# Patient Record
(demographics unavailable — no encounter records)

---

## 2024-10-10 NOTE — CONSULT LETTER
[Dear  ___] : Dear  [unfilled], [Courtesy Letter:] : I had the pleasure of seeing your patient, [unfilled], in my office today. [Please see my note below.] : Please see my note below. [Consult Closing:] : Thank you very much for allowing me to participate in the care of this patient.  If you have any questions, please do not hesitate to contact me. [Sincerely,] : Sincerely, [FreeTextEntry3] : Wero Posada MD, FACS, FASMBS , Department of Surgery, [facility]  Professor of Surgery, [facility] [employer] [facility]

## 2024-10-10 NOTE — PHYSICAL EXAM
[Obese] : obese [Normal] : affect appropriate [Obese, well nourished, in no acute distress] : obese, well nourished, in no acute distress [de-identified] : Normoactive bowel sounds, no hepatosplenomegaly, no masses, non-tender. [de-identified] : Understood consultation

## 2024-10-10 NOTE — ASSESSMENT
[FreeTextEntry1] : The patient is a morbidly obese man, (BMI=40), with significant weight related comorbidity including:  and probable obstructive sleep apnea; unable to lose weight and improve his co-morbid conditions with medical management including diet, exercise and weight loss medication.  Determination of eligibility for procedure will be based on assessment of co-morbid conditions.  At this point based on the fact that his mother had gastric bypass he is leaning towards that procedure.

## 2024-10-10 NOTE — HISTORY OF PRESENT ILLNESS
[de-identified] : Patient is a 49 year old male who presents for initial consultation for weight loss management. Patient reports struggling with weight most of their life and desires a long term more sustainable solution. Patient desires surgical intervention.   The patient denies diabetes, kidney, urinary tract disease, headaches, dizziness, seizure or neurological disorder. He denies untreated thyroid, adrenal, pituitary disease, depression or psychiatric disorder. The patient denies gallstones, heartburn, ulcer or liver disease.     Current Weight =268 (down from 298) Current BMI = 42    PMHx: PSHx: no abdominal surgeries

## 2024-10-10 NOTE — CONSULT LETTER
[Dear  ___] : Dear  [unfilled], [Courtesy Letter:] : I had the pleasure of seeing your patient, [unfilled], in my office today. [Please see my note below.] : Please see my note below. [Consult Closing:] : Thank you very much for allowing me to participate in the care of this patient.  If you have any questions, please do not hesitate to contact me. [Sincerely,] : Sincerely, [FreeTextEntry3] : Wero Posada MD, FACS, FASMBS , Department of Surgery, [facility]  Professor of Surgery, [facility] [employer] [facility]

## 2024-10-10 NOTE — PLAN
[FreeTextEntry1] : Plan: Weight loss surgery. The risks, benefits and alternatives, including laparoscopic gastric bypass, and laparoscopic vertical sleeve gastrectomy, were discussed at length and all of his questions were answered. The patient appears to understand and wishes to proceed.   The patient was given the following instructions:   1. He needs a complete medical evaluation cardiac and pulmonary which may include echocardiogram, stress test, pulmonary function test and evaluation for CPAP for sleep apnea. 2. He needs an upper endoscopy.   3. He needs dietary and psychological evaluations.   4. He must attend a preoperative support group meeting.   5. He must undergo a right upper quadrant ultrasound.   The patient clearly understood that surgery would only be scheduled if there are no medical or psychiatric contraindications and a second office visit is required.

## 2024-10-10 NOTE — HISTORY OF PRESENT ILLNESS
[de-identified] : Patient is a 49 year old male who presents for initial consultation for weight loss management. Patient reports struggling with weight most of their life and desires a long term more sustainable solution. Patient desires surgical intervention.   The patient denies diabetes, kidney, urinary tract disease, headaches, dizziness, seizure or neurological disorder. He denies untreated thyroid, adrenal, pituitary disease, depression or psychiatric disorder. The patient denies gallstones, heartburn, ulcer or liver disease.     Current Weight =268 (down from 298) Current BMI = 42    PMHx: PSHx: no abdominal surgeries

## 2024-10-22 NOTE — HISTORY OF PRESENT ILLNESS
[Genetics] : genetics [Poor Choices] : poor choices [Large Portions] : large portions [Decrease Activity] : decrease activity [de-identified] : Pt's motivation for surgery is to improve his health, increase his energy, and prevent obesity related comorbidities.  Per pt, his highest weight was 293 lbs in 2020/2021 and his lowest weight was 185 lbs at age 18.  His stated goal weight is under 200 lbs and he expresses intent to make behavioral and dietary changes towards obtaining optimal results. [de-identified] : Gastric Bypass [de-identified] : discussions with surgeon, discussions with dietician; watched online videos; Discussions with others who've had bariatric surgery [de-identified] : none.  Pt denies ED hx and bxs, including binge eating.   [de-identified] : A current typical day of eating is reported as follows: Starts with 3 glasses of water and 1 glass of water with Metamucil B: 8:30 2 eggs with veggie or carb, 2 cups mushroom coffee L: 1 pm Ordered in: salad, cauliflower pizza, burger D: 6-8 pm Homecooked, protein, pasta, veggie Drinks: 8-10 glasses water, mushroom coffee [de-identified] : nutrisystem, keto, atkins, calorie deficit, intermittent fasting, juicing everything, optavia, myfitness pal, walking 04001 steps, running, lifting. Despite multiple attempts at diet and exercise modifications, patient reports inability to maintain weight loss.

## 2024-10-22 NOTE — REASON FOR VISIT
[Other Location: e.g. School (Enter Location, City,State)___] : at [unfilled], at the time of the visit. [Other Location: e.g. Home (Enter Location, City,State)___] : at [unfilled] [Patient] : the patient [Initial Consult] : an initial consult for [Morbid Obesity (BMI>40)] : morbid obesity (bmi>40) [Referring By:  ___] : ~Hallie Ln~ was referred for psychological evaluation by Dr. CHEEMA [Attempted Weight Loss] : attempted weight loss [Commitment to Modified Lifestyle] : commitment to a modified lifestyle pre and post surgery [Difficulties with Diet Compliance] : difficulties with diet compliance  [Expectations of Outcome] : expectations of outcome [Motivation for Selecting Surgery] : motivation for selecting surgery [Strength of Social Support System] : strength of social support system [Patient Understands Data May be Shared] : patient understands that the information discussed during the evaluation would be shared with referring provider and possibly with ~his/her~ insurance provider [de-identified] : for evaluation of psychological appropriateness for bariatric surgery      [de-identified] : confidentiality and its limits were discussed

## 2024-10-22 NOTE — DISCUSSION/SUMMARY
[Behavior plan developed] : Behavior plan developed [Strategies to improve adherence identified] : Strategies to improve adherence identified [Develop and refine illness management to behavior plan] : Develop and refine illness management to behavior plan [Identify, practice refine strategies to promote adherence to regimen] : Identify, practice refine strategies to promote adherence to regimen [FreeTextEntry1] : Based on the information presented in this assessment, Mr. RODRIGUEZ does not have any current psychological contraindications for bariatric surgery. He is cleared for surgery from a psychological perspective. [de-identified] : Psychological factors (overeating, poor dietary choices) affecting other medical conditions (obesity). Obesity [FreeTextEntry3] : Behavioral strategies were discussed to increase his coping skills and assist him in making lifestyle modifications. Provided psychoeducation on changing eating behaviors in preparation for surgery. It was recommended that he attend the post-surgery group sessions for further education and support to assist him in making lifestyle changes. Additionally, it was recommended that he utilize writer and RD as needed to assist in making pre-surgical and post-surgical dietary and behavioral changes. [FreeTextEntry5] : compliance with behavioral and dietary recommendations [FreeTextEntry6] : reduced risk of medical sequelae of obesity   [FreeTextEntry9] : Increased energy

## 2024-10-22 NOTE — CURRENT PSYCHIATRIC SYMPTOMS
[de-identified] : Pt reports experiencing a depressed mood for 6 months during the COVID-19 pandemic due to coping with long-standing symptoms of the illness as well as related weight-gain. He denied any current symptoms of depression.  [de-identified] :  Pt denied current or past symptoms of rigoberto. [de-identified] : no delusions, no visual hallucinations, no auditory hallucinations and a thought disorder was not noted. No evidence of psychosis. [de-identified] : Pt reported financial worries, though denied that they interfere in his functioning of affect his eating behaviors. [de-identified] : denied [de-identified] : denied [FreeTextEntry1] : Pt denied any history of mental health treatment. He denied ever attending psychotherapy, denied ever taking psychotropic medication and denied ever being hospitalized for a psychiatric reason.

## 2024-10-22 NOTE — PHYSICAL EXAM
[Normal] : good [AH] : no auditory hallucinations [VH] : no visual hallucinations [Suicidal Ideation] : no suicidal ideation [Homicidal Ideation] : no homicidal ideation [FreeTextEntry1] : normal appearance, well groomed, well nourished, obese [FreeTextEntry8] : "fair to good"

## 2024-10-22 NOTE — REASON FOR VISIT
[Other Location: e.g. School (Enter Location, City,State)___] : at [unfilled], at the time of the visit. [Other Location: e.g. Home (Enter Location, City,State)___] : at [unfilled] [Patient] : the patient [Initial Consult] : an initial consult for [Morbid Obesity (BMI>40)] : morbid obesity (bmi>40) [Referring By:  ___] : ~Hallie Ln~ was referred for psychological evaluation by Dr. CHEEMA [Attempted Weight Loss] : attempted weight loss [Commitment to Modified Lifestyle] : commitment to a modified lifestyle pre and post surgery [Difficulties with Diet Compliance] : difficulties with diet compliance  [Expectations of Outcome] : expectations of outcome [Motivation for Selecting Surgery] : motivation for selecting surgery [Strength of Social Support System] : strength of social support system [Patient Understands Data May be Shared] : patient understands that the information discussed during the evaluation would be shared with referring provider and possibly with ~his/her~ insurance provider [de-identified] : for evaluation of psychological appropriateness for bariatric surgery      [de-identified] : confidentiality and its limits were discussed

## 2024-10-22 NOTE — CURRENT PSYCHIATRIC SYMPTOMS
[de-identified] : Pt reports experiencing a depressed mood for 6 months during the COVID-19 pandemic due to coping with long-standing symptoms of the illness as well as related weight-gain. He denied any current symptoms of depression.  [de-identified] :  Pt denied current or past symptoms of rigoberto. [de-identified] : no delusions, no visual hallucinations, no auditory hallucinations and a thought disorder was not noted. No evidence of psychosis. [de-identified] : Pt reported financial worries, though denied that they interfere in his functioning of affect his eating behaviors. [de-identified] : denied [de-identified] : denied [FreeTextEntry1] : Pt denied any history of mental health treatment. He denied ever attending psychotherapy, denied ever taking psychotropic medication and denied ever being hospitalized for a psychiatric reason.

## 2024-10-22 NOTE — HISTORY OF PRESENT ILLNESS
[Genetics] : genetics [Poor Choices] : poor choices [Large Portions] : large portions [Decrease Activity] : decrease activity [de-identified] : Pt's motivation for surgery is to improve his health, increase his energy, and prevent obesity related comorbidities.  Per pt, his highest weight was 293 lbs in 2020/2021 and his lowest weight was 185 lbs at age 18.  His stated goal weight is under 200 lbs and he expresses intent to make behavioral and dietary changes towards obtaining optimal results. [de-identified] : Gastric Bypass [de-identified] : discussions with surgeon, discussions with dietician; watched online videos; Discussions with others who've had bariatric surgery [de-identified] : none.  Pt denies ED hx and bxs, including binge eating.   [de-identified] : A current typical day of eating is reported as follows: Starts with 3 glasses of water and 1 glass of water with Metamucil B: 8:30 2 eggs with veggie or carb, 2 cups mushroom coffee L: 1 pm Ordered in: salad, cauliflower pizza, burger D: 6-8 pm Homecooked, protein, pasta, veggie Drinks: 8-10 glasses water, mushroom coffee [de-identified] : nutrisystem, keto, atkins, calorie deficit, intermittent fasting, juicing everything, optavia, myfitness pal, walking 87986 steps, running, lifting. Despite multiple attempts at diet and exercise modifications, patient reports inability to maintain weight loss.

## 2024-10-22 NOTE — SOCIAL HISTORY
[None] : none [FreeTextEntry1] : Lives with his wife and 2 kids [FreeTextEntry2] : Employed full time in sales and purchasing for a [employer] [FreeTextEntry3] :  with 2 children, ages 15 and 13, as well as an adult step-son age 27.  [FreeTextEntry4] : [employer] in 2019

## 2024-10-22 NOTE — DISCUSSION/SUMMARY
[Behavior plan developed] : Behavior plan developed [Strategies to improve adherence identified] : Strategies to improve adherence identified [Develop and refine illness management to behavior plan] : Develop and refine illness management to behavior plan [Identify, practice refine strategies to promote adherence to regimen] : Identify, practice refine strategies to promote adherence to regimen [FreeTextEntry1] : Based on the information presented in this assessment, Mr. RODRIGUEZ does not have any current psychological contraindications for bariatric surgery. He is cleared for surgery from a psychological perspective. [de-identified] : Psychological factors (overeating, poor dietary choices) affecting other medical conditions (obesity). Obesity [FreeTextEntry3] : Behavioral strategies were discussed to increase his coping skills and assist him in making lifestyle modifications. Provided psychoeducation on changing eating behaviors in preparation for surgery. It was recommended that he attend the post-surgery group sessions for further education and support to assist him in making lifestyle changes. Additionally, it was recommended that he utilize writer and RD as needed to assist in making pre-surgical and post-surgical dietary and behavioral changes. [FreeTextEntry5] : compliance with behavioral and dietary recommendations [FreeTextEntry6] : reduced risk of medical sequelae of obesity   [FreeTextEntry9] : Increased energy

## 2024-11-18 NOTE — ASSESSMENT
[FreeTextEntry1] : Patient is a 49-year-old male seen in the gastroenterology office today prior to scheduling an upper GI endoscopy (esophagogastroduodenoscopy) as part of pre-operative evaluation for bariatric surgery.  Patient reported no acute complaints or symptoms during office visit today.  Will schedule patient for upper GI endoscopy.  Discussed indication, benefits/risks and alternatives to upper GI endoscopy with the patient.  He reported understanding and he is in agreement with proceeding with upper GI endoscopy.  All of his questions were answered.       --Schedule upper GI endoscopy (esophagogastroduodenoscopy).

## 2024-11-18 NOTE — HISTORY OF PRESENT ILLNESS
[FreeTextEntry1] : Patient is a 49-year-old male who presents to the gastroenterology office for evaluation prior to scheduling an upper GI endoscopy (esophagogastroduodenoscopy) as part of pre-operative evaluation for bariatric surgery.  Per chart, patient recently seen by Dr. Wero Posada from bariatric surgery in October 2024 for evaluation for obesity.  During office visit today, patient reports no acute complaints or symptoms.  Patient denies experiencing abdominal pain, chest pain, nausea, vomiting, heartburn, acid reflux, difficulty swallowing or painful swallowing.  Patient denies any acute changes in bowel habits.  Patient denies diarrhea or constipation.  Patient denies red blood in stools and denies black stools.  Patient denies unintentional weight loss.  Patient denies family history of colon cancer or rectal cancer.  Patient reports having most recent colonoscopy performed earlier this year at an outside facility and reports the colonoscopy was normal.  Patient denies having a prior upper endoscopy.

## 2024-11-25 NOTE — PHYSICAL EXAM
[de-identified] : cerumen removed AD [Midline] : trachea located in midline position [Normal] : orientation to person, place, and time: normal

## 2024-11-25 NOTE — HISTORY OF PRESENT ILLNESS
[de-identified] : 48 y/o M referred by PCP, Dr. Dinero, for b/l tinnitus, right worse than left. Started about 1.5 years ago noted "long beeping noise", was more intermittent, but within past 6 months more frequent and loud in right ear. When tilting head right or left, noted to be louder. No concerns for hearing loss. Father with age related hearing loss, wears hearing aids. Denies head traumas -- occasionally 1 cup of coffee weekly. Denies otalgia, otorrhea, ear infections, dizziness, vertigo, headaches related to hearing.

## 2024-11-25 NOTE — DATA REVIEWED
[de-identified] : Hearing WNL, Au. Type A Tymp, Au. Tinnitus: Pitch at 2 KHz and loudness at 15 dBHL

## 2025-01-10 NOTE — PHYSICAL EXAM
[Alert] : alert [No Acute Distress] : no acute distress [Sclera] : the sclera and conjunctiva were normal [Oropharynx] : the oropharynx was normal [Normal Appearance] : the appearance of the neck was normal [No Respiratory Distress] : no respiratory distress [No Acc Muscle Use] : no accessory muscle use [Respiration, Rhythm And Depth] : normal respiratory rhythm and effort [Heart Rate And Rhythm] : heart rate was normal and rhythm regular [Bowel Sounds] : normal bowel sounds [Abdomen Tenderness] : non-tender [Abdomen Soft] : soft [No CVA Tenderness] : no CVA  tenderness [Abnormal Walk] : normal gait [Normal Color / Pigmentation] : normal skin color and pigmentation [Oriented To Time, Place, And Person] : oriented to person, place, and time [Normal Affect] : the affect was normal [Normal Mood] : the mood was normal [Rebound Tenderness] : no rebound tenderness [de-identified] : Moderate-sized non-bleeding external hemorrhoid, no blood (rectal exam performed with medical assistant Alisha Awad present in room as chaperone).

## 2025-01-10 NOTE — HISTORY OF PRESENT ILLNESS
[FreeTextEntry1] : Patient is a 49-year-old male who presents to the gastroenterology office for follow-up.  He recently underwent an EGD (upper GI endoscopy) in December 2024 for evaluation as part of preoperative gastrointestinal examination prior to possible bariatric surgery.  The EGD revealed 1 tongue of salmon-colored mucosa in the distal esophagus; 1 patch of salmon-colored mucosa in the proximal esophagus with endoscopic appearance suggestive of gastric inlet patch; patchy erythema in the gastric body and gastric antrum; four gastric polyps in the gastric body which were removed, and normal duodenal bulb/second part of duodenum.  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  During office visit today, the EGD findings and pathology results from EGD were discussed in detail with the patient.  During office visit today, patient reports noticing a small protrusion near his anus during some of his bowel movements.  He thinks this may be a hemorrhoid however he is not sure.  He denies any pain secondary to the protrusion near his anus.  He reports history of constipation and reports currently taking Metamucil fiber 2 tablespoons once daily.  He reports his constipation is well-controlled with the Metamucil fiber daily.  He reports having 2-3 bowel movements per day, consisting of formed stools.  He denies any excessive straining during bowel movements.  He reports having his most recent colonoscopy within the past 6 months by an outside gastroenterologist.  He reports his colonoscopy was normal.  The prior colonoscopy report is not available for review.  He denies experiencing abdominal pain, chest pain, shortness of breath, nausea, vomiting, heartburn, acid reflux, difficulty swallowing or painful swallowing.

## 2025-01-10 NOTE — PHYSICAL EXAM
[Alert] : alert [No Acute Distress] : no acute distress [Sclera] : the sclera and conjunctiva were normal [Oropharynx] : the oropharynx was normal [Normal Appearance] : the appearance of the neck was normal [No Respiratory Distress] : no respiratory distress [No Acc Muscle Use] : no accessory muscle use [Respiration, Rhythm And Depth] : normal respiratory rhythm and effort [Heart Rate And Rhythm] : heart rate was normal and rhythm regular [Bowel Sounds] : normal bowel sounds [Abdomen Tenderness] : non-tender [Abdomen Soft] : soft [No CVA Tenderness] : no CVA  tenderness [Abnormal Walk] : normal gait [Normal Color / Pigmentation] : normal skin color and pigmentation [Oriented To Time, Place, And Person] : oriented to person, place, and time [Normal Affect] : the affect was normal [Normal Mood] : the mood was normal [Rebound Tenderness] : no rebound tenderness [de-identified] : Moderate-sized non-bleeding external hemorrhoid, no blood (rectal exam performed with medical assistant Alisha Awad present in room as chaperone).

## 2025-01-10 NOTE — ASSESSMENT
[FreeTextEntry1] : Patient is a 49-year-old male seen in the gastroenterology office today for follow-up.   His recent EGD revealed minimal to mild gastritis.  Therefore, will treat patient with omeprazole 20 mg once a day for 2 months. His EGD also revealed 1 tongue of salmon-colored mucosa in the distal esophagus, with biopsies revealing inflammation however no Mcarthur's esophagus was identified.  Given the endoscopic appearance suggestive of Mcarthur's esophagus, although biopsies were negative for Mcarthur's, will plan for repeat EGD (upper GI endoscopy) in 1 year for repeat evaluation and biopsies from the salmon-colored mucosa in the distal esophagus if still present.  Discussed with patient and he is in agreement with repeat EGD in 1 year. Patient with reported history of constipation, which is currently well-controlled with over-the-counter Metamucil fiber 2 tablespoons once daily.  Patient to continue with Metamucil fiber daily. Patient reported noticing a small protrusion near his anus during some of his bowel movements.  Examination revealed moderate size external hemorrhoid.  Patient was informed to schedule an office appointment with colorectal surgeon for further evaluation of hemorrhoids.  Patient was provided the names and office numbers for multiple colorectal surgeons.  He was advised to schedule an appointment with a colorectal surgeon of his choice.  Patient reported understanding and is agreement.      -- Omeprazole 20 mg once a day for 2 months.      -- Continue Metamucil fiber daily.      -- Repeat EGD in 1 year.      -- Evaluation of hemorrhoids by colorectal surgeon.

## 2025-02-04 NOTE — REVIEW OF SYSTEMS
[Fever] : no fever [Chills] : no chills [Chest Pain] : no chest pain [Palpitations] : no palpitations [Shortness Of Breath] : no shortness of breath [Cough] : no cough [Abdominal Pain] : no abdominal pain [Vomiting] : no vomiting [Constipation] : no constipation [Diarrhea] : no diarrhea

## 2025-02-04 NOTE — HISTORY OF PRESENT ILLNESS
[FreeTextEntry1] : 50 yo M with hx of morbid obesity, LIZETTE on Cpap, noted with hepatic steatosis on ultrasound for bariatric preop evaluation.   Patient has been struggling with weight loss since age 21.  Highest weight around 290lbs. bounces up and down depending on high diet and lifestyle.  Tried multiple interventions with diet and medications but not sustainable.  since turning 50 noticed more energy deprive and symptoms from bring over weight.  therefore decided to move forward with bariatric surgery.   Never told to have fatty liver in the past or abnormal liver tests.  ABD US done in 12/9/2024 1. Diffusely increased and heterogeneous echogenicity of the liver parenchyma compatible with moderately severe diffuse hepatic steatosis. 2. Layering sludge in the gallbladder. No evidence of gallstones, gallbladder wall thickening or pericholecystic fluid.  Denies hx of hospitalization due to liver disease. Denies hx of hepatitis. Denies fever, chills, nausea, vomiting, jaundice, melena, maroon stool, abd pain, abdominal distention, confusion, asterixis, or unintentional weight loss.  ETOH: occasional on events average once a month 1-2 glasses of wine. no hx of binge drinking or withdrawal.  No smoking hx. No family hx of liver disease.   Father with HTN. mother also had bariatric surgery.  OTC: allergy medication- urticaria (cold/dry air).  No active medications.

## 2025-02-04 NOTE — ASSESSMENT
[FreeTextEntry1] : 50 yo M with hx of morbid obesity planning for bariatric surgery need hepatology clearance given steatosis on imaging.   Will obtain MRI/MRE for fibrosis staging and better evaluation of the liver morphology to r/o advanced fibrosis and liver lesion. Given patient body composition will be techinically challenging to perform a good quality fibroscan.  Obtian LFTs today with hepatitis screening.  If above results concerning of advanced fibrosis or other liver disease will consider liver biopsy for definitive diagnosis.   Counseled on natural hx of fatty liver disease Counseled on diet - limiting carbs and increasing protein and vegetable intake 30min exercise daily Goal 7-10% weight loss Discussed concern of disease progression to BLAKE and more advanced fibrosis as well Bariatric surgery can help with weight loss and potentially reverse steatosis.   Will call patient to review above testing results.  RTC depending on above investigation.

## 2025-02-04 NOTE — PHYSICAL EXAM
[General Appearance - Alert] : alert [General Appearance - In No Acute Distress] : in no acute distress [Edema] : there was no peripheral edema [Abdomen Soft] : soft [Scleral Icterus] : No Scleral Icterus [Spider Angioma] : No spider angioma(s) were observed [Abdominal  Ascites] : no ascites [Asterixis] : no asterixis observed [Jaundice] : No jaundice [Depression] : no depression

## 2025-04-29 NOTE — PHYSICAL EXAM
[Well Developed] : well developed [Well Nourished] : well nourished [No Acute Distress] : no acute distress [Normal Conjunctiva] : normal conjunctiva [Normal Venous Pressure] : normal venous pressure [No Carotid Bruit] : no carotid bruit [Normal S1, S2] : normal S1, S2 [No Rub] : no rub [No Gallop] : no gallop [Clear Lung Fields] : clear lung fields [Good Air Entry] : good air entry [No Respiratory Distress] : no respiratory distress  [Soft] : abdomen soft [Non Tender] : non-tender [No Masses/organomegaly] : no masses/organomegaly [Normal Bowel Sounds] : normal bowel sounds [Normal Gait] : normal gait [No Edema] : no edema [No Cyanosis] : no cyanosis [No Clubbing] : no clubbing [No Varicosities] : no varicosities [No Rash] : no rash [No Skin Lesions] : no skin lesions [Moves all extremities] : moves all extremities [No Focal Deficits] : no focal deficits [Normal Speech] : normal speech [Alert and Oriented] : alert and oriented [Normal memory] : normal memory [de-identified] : +II/VI HSM

## 2025-04-29 NOTE — HISTORY OF PRESENT ILLNESS
[FreeTextEntry1] : - 4- -Perioperative Recommendations (and cardiac clearance) for VAHID RODRIGUEZ for bariatric procedure   - EKG without ischemic changes - Patient is asymptomatic and stable from a cardiac standpoint. - VAHID requires no further cardiac testing prior to procedure and may proceed from cardiac standpoint.   Lane Lam MD  Dear Wero,   I had the pleasure of seeing your patient VAHID RODRIGUEZ for Cardiometabolic evaluation.   As you know, he  is a Pleasant, 49 year old with a past medical history of Morbid Obesity (FHx Mother - PCIs 70s) =============== =============== Morbid Obesity (FHx Mother - PCIs 70s) Atypical CP Pre Op and   Slight murmur - TTE - normal  - Stress EKG - normal     ----------------------------

## 2025-04-29 NOTE — DISCUSSION/SUMMARY
[FreeTextEntry1] : In summary   Damion, 49 year old with a past medical history of Morbid Obesity (FHx Mother - PCIs 70s) =============== =============== Morbid Obesity (FHx Mother - PCIs 70s) Atypical CP Pre Op - TTE - normal  - Stress EKG - normal   Cleared for bariatric surgery  Wero, kind thanks for the referral.   Lane Lam MD [facility] SNEHA MCDONALD Director, Preventive Cardiology & Lipidology [facility] [place]                                                                                                                                                                                                                                                                     --                                          ----------- 23 minutes was provided for preventive counseling on healthy diet, weight maintenance, CV risk reduction. Specifically, and separate from other cardiovascular evaluation and treatment, we discussed h willingness to focus  on lifestyle changes, particularly dietary; including greater consumption of vegetables, fruits over saturated fats. We discussed appropriate follow up to monitor progress on these areas.     -                                                                                                                                                                                                                                                                                                                                                                                                                                                                                                                                                                                                                                                                                                                                         -----------  [EKG obtained to assist in diagnosis and management of assessed problem(s)] : EKG obtained to assist in diagnosis and management of assessed problem(s)

## 2025-04-29 NOTE — PHYSICAL EXAM
[Well Developed] : well developed [Well Nourished] : well nourished [No Acute Distress] : no acute distress [Normal Conjunctiva] : normal conjunctiva [Normal Venous Pressure] : normal venous pressure [No Carotid Bruit] : no carotid bruit [Normal S1, S2] : normal S1, S2 [No Rub] : no rub [No Gallop] : no gallop [Clear Lung Fields] : clear lung fields [Good Air Entry] : good air entry [No Respiratory Distress] : no respiratory distress  [Soft] : abdomen soft [Non Tender] : non-tender [No Masses/organomegaly] : no masses/organomegaly [Normal Bowel Sounds] : normal bowel sounds [Normal Gait] : normal gait [No Edema] : no edema [No Cyanosis] : no cyanosis [No Clubbing] : no clubbing [No Varicosities] : no varicosities [No Rash] : no rash [No Skin Lesions] : no skin lesions [Moves all extremities] : moves all extremities [No Focal Deficits] : no focal deficits [Normal Speech] : normal speech [Alert and Oriented] : alert and oriented [Normal memory] : normal memory [de-identified] : +II/VI HSM

## 2025-04-29 NOTE — DISCUSSION/SUMMARY
[FreeTextEntry1] : In summary   Damion, 49 year old with a past medical history of Morbid Obesity (FHx Mother - PCIs 70s) =============== =============== Morbid Obesity (FHx Mother - PCIs 70s) Atypical CP Pre Op - TTE - normal  - Stress EKG - normal   Cleared for bariatric surgery  Wero, kind thanks for the referral.   Lane Lam MD [facility] SNEHA MCDONALD Director, Preventive Cardiology & Lipidology [facility] Cardiovascular [place]                                                                                                                                                                                                                                                                     --                                          ----------- 23 minutes was provided for preventive counseling on healthy diet, weight maintenance, CV risk reduction. Specifically, and separate from other cardiovascular evaluation and treatment, we discussed h willingness to focus  on lifestyle changes, particularly dietary; including greater consumption of vegetables, fruits over saturated fats. We discussed appropriate follow up to monitor progress on these areas.     -                                                                                                                                                                                                                                                                                                                                                                                                                                                                                                                                                                                                                                                                                                                                         -----------  [EKG obtained to assist in diagnosis and management of assessed problem(s)] : EKG obtained to assist in diagnosis and management of assessed problem(s)

## 2025-05-22 NOTE — PHYSICAL EXAM
[de-identified] : non-icteric [de-identified] : WNL [de-identified] : WNL [de-identified] : Healing incisions

## 2025-05-22 NOTE — PHYSICAL EXAM
[de-identified] : non-icteric [de-identified] : WNL [de-identified] : WNL [de-identified] : Healing incisions

## 2025-05-22 NOTE — HISTORY OF PRESENT ILLNESS
[Procedure: ___] : Procedure performed: [unfilled]  [Date of Surgery: ___] : Date of Surgery:   [unfilled] [Surgeon Name:   ___] : Surgeon Name: Dr. CHEEMA [Pre-Op Weight ___] : Pre-op weight was [unfilled] lbs [___ Days Post Op] : [unfilled] days  [Phase 1] : Phase 1 [de-identified] :   Patient presents with his wife for POV. Surgical incision is clean, dry, and intact. Is tolerating diet. Drinks 64 oz of water along with protein shakes and crystallite. Will start daily vitamins. Maintaining a high protein, low carb diet. Denies nausea/vomiting. Does not have acid reflux. Denies fevers/chills. BMs are normal. Did have one episode of dumping after eating Greek yogurt.  [___Kcal] : [unfilled] Kcal [___gm Protein] : [unfilled] gm protein [Diabetes] : Diabetes [Hypertension] : Hypertension [Sleep Apnea] : Sleep Apnea [GERD] : no GERD [Hyperlipidemia] : Hyperlipidemia

## 2025-05-22 NOTE — ASSESSMENT
[___ Days Post Op] : [unfilled] days [Previous Visit Weight: ___] : Previous visit weight: [unfilled] lbs [Today's Weight: ___] : Today's weight:[unfilled] lbs [de-identified] : Plan: Continue high-protein low-carb diet Dietary counseling provided Increase activities as tolerated Check blood work next visit Continue vitamins daily Follow-up in 4 weeks

## 2025-05-22 NOTE — HISTORY OF PRESENT ILLNESS
[Procedure: ___] : Procedure performed: [unfilled]  [Date of Surgery: ___] : Date of Surgery:   [unfilled] [Surgeon Name:   ___] : Surgeon Name: Dr. CHEEMA [Pre-Op Weight ___] : Pre-op weight was [unfilled] lbs [___ Days Post Op] : [unfilled] days  [Phase 1] : Phase 1 [de-identified] :   Patient presents with his wife for POV. Surgical incision is clean, dry, and intact. Is tolerating diet. Drinks 64 oz of water along with protein shakes and crystallite. Will start daily vitamins. Maintaining a high protein, low carb diet. Denies nausea/vomiting. Does not have acid reflux. Denies fevers/chills. BMs are normal. Did have one episode of dumping after eating Greek yogurt.  [___Kcal] : [unfilled] Kcal [___gm Protein] : [unfilled] gm protein [Diabetes] : Diabetes [Hypertension] : Hypertension [Sleep Apnea] : Sleep Apnea [GERD] : no GERD [Hyperlipidemia] : Hyperlipidemia

## 2025-05-22 NOTE — ASSESSMENT
[___ Days Post Op] : [unfilled] days [Previous Visit Weight: ___] : Previous visit weight: [unfilled] lbs [Today's Weight: ___] : Today's weight:[unfilled] lbs [de-identified] : Plan: Continue high-protein low-carb diet Dietary counseling provided Increase activities as tolerated Check blood work next visit Continue vitamins daily Follow-up in 4 weeks

## 2025-06-11 NOTE — ASSESSMENT
[Today's Weight: ___] : Today's weight:[unfilled] lbs [___ Months Post Op] : [unfilled] months [Previous Visit Weight: ___] : Previous visit weight: [unfilled] lbs [de-identified] : Plan: Continue high-protein low-carb diet Dietary counseling provided Increase activities as tolerated Check blood work next visit Continue vitamins daily Follow-up in 4 weeks

## 2025-06-11 NOTE — HISTORY OF PRESENT ILLNESS
[Procedure: ___] : Procedure performed: [unfilled]  [Date of Surgery: ___] : Date of Surgery:   [unfilled] [Surgeon Name:   ___] : Surgeon Name: Dr. CHEEMA [Pre-Op Weight ___] : Pre-op weight was [unfilled] lbs [___Kcal] : [unfilled] Kcal [___gm Protein] : [unfilled] gm protein [Diabetes] : Diabetes [Sleep Apnea] : Sleep Apnea [Hypertension] : Hypertension [Hyperlipidemia] : Hyperlipidemia [___ Months Post Op] : [unfilled] months [de-identified] :   Patient presents with his wife for POV. Surgical incision is clean, dry, and intact. Is tolerating diet. Drinks 64 oz of water along with protein shakes and crystallite. Will start daily vitamins. Maintaining a high protein, low carb diet. Denies nausea/vomiting. Does not have acid reflux. Denies fevers/chills. BMs are normal. Did have one episode of dumping after eating Greek yogurt. Resolved pruritic rash around port sites s/p topical steroids.  [Phase 4] : Phase 4 [GERD] : no GERD

## 2025-06-11 NOTE — PHYSICAL EXAM
[de-identified] : non-icteric [de-identified] : WNL [de-identified] : WNL [de-identified] : Healing incisions

## 2025-06-19 NOTE — HISTORY OF PRESENT ILLNESS
[de-identified] : Patient presents with his wife for POV. Surgical incision is clean, dry, and intact. Is tolerating diet. Drinks 64 oz of water along with protein shakes and crystallite. Will start daily vitamins. Maintaining a high protein, low carb diet. Denies nausea/vomiting. Does not have acid reflux. Denies fevers/chills. BMs are normal. Did have one episode of dumping after eating Greek yogurt. Almost resolved pruritic rash around port sites s/p topical steroids.  [GERD] : no GERD

## 2025-06-19 NOTE — ASSESSMENT
[de-identified] : Plan: Continue high-protein low-carb diet Dietary counseling provided Increase activities as tolerated Check blood work next visit Continue vitamins daily Will see Derm Follow-up in 6 weeks

## 2025-06-19 NOTE — PHYSICAL EXAM
[de-identified] : non-icteric [de-identified] : WNL [de-identified] : WNL [de-identified] : Healing incisions

## 2025-06-19 NOTE — PHYSICAL EXAM
[de-identified] : non-icteric [de-identified] : WNL [de-identified] : WNL [de-identified] : Healing incisions

## 2025-06-19 NOTE — HISTORY OF PRESENT ILLNESS
[de-identified] : Patient presents with his wife for POV. Surgical incision is clean, dry, and intact. Is tolerating diet. Drinks 64 oz of water along with protein shakes and crystallite. Will start daily vitamins. Maintaining a high protein, low carb diet. Denies nausea/vomiting. Does not have acid reflux. Denies fevers/chills. BMs are normal. Did have one episode of dumping after eating Greek yogurt. Almost resolved pruritic rash around port sites s/p topical steroids.  [GERD] : no GERD

## 2025-06-19 NOTE — ASSESSMENT
[de-identified] : Plan: Continue high-protein low-carb diet Dietary counseling provided Increase activities as tolerated Check blood work next visit Continue vitamins daily Will see Derm Follow-up in 6 weeks

## 2025-07-09 NOTE — ASSESSMENT
[FreeTextEntry1] : Patient is a 50-year-old male seen in the gastroenterology office today for follow-up.  Patient has history of minimal to mild gastritis noted on a prior EGD (upper GI endoscopy) in December 2024, for which he was subsequently treated with omeprazole.  He currently has no significant symptoms from a GI perspective at present time. His prior EGD in December 2024 also revealed 1 tongue of salmon-colored mucosa in the distal esophagus, with pathology results from biopsies revealing inflammation however no Mcarthur's esophagus was identified. Given the endoscopic appearance suggestive of Mcarthur's esophagus, although biopsies were negative for Mcarthur's, will plan for repeat EGD in 1 year (from time of prior EGD) for repeat evaluation and biopsies from the salmon-colored mucosa in the distal esophagus if still present. Discussed with patient and he is in agreement with repeat EGD in 1 year (from time of prior EGD). Patient has history of hepatic steatosis for which he follows up with hepatologist Dr. Adams Moreau.  Patient reported having an upcoming appointment to see Dr. Moreau next week.  Patient was informed to continue following up with Dr. Moreau as recommended by him.       --Repeat EGD in 1 year (from time of prior EGD).

## 2025-07-09 NOTE — PHYSICAL EXAM
[Alert] : alert [No Acute Distress] : no acute distress [Sclera] : the sclera and conjunctiva were normal [Normal Appearance] : the appearance of the neck was normal [No Respiratory Distress] : no respiratory distress [No Acc Muscle Use] : no accessory muscle use [Respiration, Rhythm And Depth] : normal respiratory rhythm and effort [Heart Rate And Rhythm] : heart rate was normal and rhythm regular [Bowel Sounds] : normal bowel sounds [Abdomen Tenderness] : non-tender [Abdomen Soft] : soft [Abnormal Walk] : normal gait [Oriented To Time, Place, And Person] : oriented to person, place, and time [Normal Affect] : the affect was normal [Normal Mood] : the mood was normal [Rebound Tenderness] : no rebound tenderness

## 2025-07-09 NOTE — HISTORY OF PRESENT ILLNESS
[FreeTextEntry1] : Patient is a 50-year-old male who presents to the gastroenterology office for follow-up.  Patient has history of constipation and gastritis.  His gastritis was previously treated with omeprazole.  During office visit today, patient reports no symptoms or complaints.  He reports having undergone Roderick-en-Y gastric bypass surgery earlier this year.  He reports his constipation has resolved after gastric bypass surgery and the resulting intentional weight loss after surgery.  He reports currently having 1-2 bowel movements per day, consisting of formed stools.  He denies straining during bowel movements.  He reports no longer taking Metamucil fiber, since his constipation has resolved.  He denies red blood in stools and denies black stools.  He reports his hemorrhoids have also improved after gastric bypass surgery and with the resulting weight loss.  He denies experiencing any abdominal pain, chest pain, shortness of breath, heartburn, acid reflux, dysphagia, odynophagia, nausea or vomiting.  He does follow-up with a hepatologist for hepatic steatosis.  He reports having an upcoming appointment with the hepatologist next week.

## 2025-07-15 NOTE — ASSESSMENT
[FreeTextEntry1] : 49 yo M with hx of morbid obesity planning for bariatric surgery need hepatology clearance given steatosis on imaging.   Counseled on natural hx of fatty liver disease Counseled on diet - limiting carbs and increasing protein and vegetable intake 30min exercise daily Goal 7-10% weight loss Discussed concern of disease progression to BLAKE and more advanced fibrosis as well Bariatric surgery can help with weight loss and potentially reverse steatosis.   MRE with Steatosis without fibrosis Liver bx consistent with these findings - limited fibrosis stage  Ongoing weight loss noted Steatosis WILL reverse with time Repeat FIBROSCAN in May 2026 Patient can call for appointment and reminder in march 2026  If there is progression of disease F2 or more then consider use of Rezzdiffra in future

## 2025-07-15 NOTE — HISTORY OF PRESENT ILLNESS
[FreeTextEntry1] : 49 yo M with hx of morbid obesity, LIZETTE on Cpap, noted with hepatic steatosis on ultrasound for bariatric preop evaluation.   Patient has been struggling with weight loss since age 21.  Highest weight around 290lbs. bounces up and down depending on high diet and lifestyle.  Tried multiple interventions with diet and medications but not sustainable.  since turning 50 noticed more energy deprive and symptoms from bring over weight.  therefore decided to move forward with bariatric surgery.   Never told to have fatty liver in the past or abnormal liver tests.  ABD US done in 2024 1. Diffusely increased and heterogeneous echogenicity of the liver parenchyma compatible with moderately severe diffuse hepatic steatosis. 2. Layering sludge in the gallbladder. No evidence of gallstones, gallbladder wall thickening or pericholecystic fluid.  Denies hx of hospitalization due to liver disease. Denies hx of hepatitis. Denies fever, chills, nausea, vomiting, jaundice, melena, maroon stool, abd pain, abdominal distention, confusion, asterixis, or unintentional weight loss.  ETOH: occasional on events average once a month 1-2 glasses of wine. no hx of binge drinking or withdrawal.  No smoking hx. No family hx of liver disease.   Father with HTN. mother also had bariatric surgery.  OTC: allergy medication- urticaria (cold/dry air).  No active medications.   Interval Hx - got lap-gastric bypass and liver bx may 15th - down 40 lbs - liver bx c/w Steatosis S3 F1   Specimen(s) Submitted 1  Biopsy of  liver  Final Diagnosis Liver, biopsy -  Steatotic liver disease (moderate steatosis, mild portal fibrosis) (see note and interpretation)  The liver biopsy shows subcapsular liverliver tissue with Note: preserved hepatic architecture, as supported by trichrome and reticulin stains. The trichrome stain reveals portal fibrosis. The portal tracts show mild chronic inflammation. The lobular parenchyma shows moderatemacrovasiclular steatosis (mixed small and large droplet; 35-40%) and without obvious hepatocyte ballooning. There is minimal lobular activity. Geno-Denk bodies are not seen. The iron stain shows is negative. The D-PAS stain is negative for intracytoplasmic globules.  Interpretation:  Patient's clinical history of obesity is noted. The histologic features are suggestive of fatty liver disease, likely secondary to metabolic dysfunction-associated steatotic liver disease (MASLD), with mild fibrosis. Differential diagnosis includes diabetes, obesity, medication-induced liver injury or other metabolic disorders. Concomitant alcohol associated liver disease needs clinical exclusion.  Fatty Liver Disease Scoring  NAFLD Activity Score (MILLICENT): Steatosis: 2 (35-40%) Lobular inflammation (foci per 20x field): 1 (<2 foci) Hepatocyte balloonin Total Score: 2/8.  Stage: 1   No other complaints at this time Feels like weight has plateaued

## 2025-07-18 NOTE — PHYSICAL EXAM
[de-identified] : non-icteric [de-identified] : WNL [de-identified] : WNL [de-identified] : Healing incisions

## 2025-07-18 NOTE — HISTORY OF PRESENT ILLNESS
[Procedure: ___] : Procedure performed: [unfilled]  [Date of Surgery: ___] : Date of Surgery:   [unfilled] [Surgeon Name:   ___] : Surgeon Name: Dr. CHEEMA [Pre-Op Weight ___] : Pre-op weight was [unfilled] lbs [___ Months Post Op] : [unfilled] months [Phase 4] : Phase 4 [___Kcal] : [unfilled] Kcal [___gm Protein] : [unfilled] gm protein [Diabetes] : Diabetes [Hypertension] : Hypertension [Sleep Apnea] : Sleep Apnea [Hyperlipidemia] : Hyperlipidemia [de-identified] : Patient presents with his wife for POV. Surgical incision is clean, dry, and intact. Is tolerating diet. Drinks 64 oz of water along with protein shakes and crystallite. Will start daily vitamins. Maintaining a high protein, low carb diet. Denies nausea/vomiting. Does not have acid reflux. Denies fevers/chills. BMs are normal. Did have one episode of dumping after eating Greek yogurt. Almost resolved pruritic rash around port sites s/p topical steroids.  [GERD] : no GERD

## 2025-07-18 NOTE — ASSESSMENT
[Previous Visit Weight: ___] : Previous visit weight: [unfilled] lbs [___ Months Post Op] : [unfilled] months [Today's Weight: ___] : Today's weight:[unfilled] lbs [de-identified] : Plan: Continue high-protein low-carb diet Dietary counseling provided Increase activities as tolerated Check blood work  Continue vitamins daily Will see Derm Follow-up in 6 weeks

## 2025-07-24 NOTE — HISTORY OF PRESENT ILLNESS
[de-identified] : Patient presents with his wife for POV. Surgical incision is clean, dry, and intact. Is tolerating diet. Drinks 64 oz of water along with protein shakes and crystallite. Will start daily vitamins. Maintaining a high protein, low carb diet. Denies nausea/vomiting. Does not have acid reflux. Denies fevers/chills. BMs are normal. Did have one episode of dumping after eating Greek yogurt. Almost resolved pruritic rash around port sites s/p topical steroids.  [GERD] : no GERD

## 2025-07-24 NOTE — HISTORY OF PRESENT ILLNESS
[de-identified] : Patient presents with his wife for POV. Surgical incision is clean, dry, and intact. Is tolerating diet. Drinks 64 oz of water along with protein shakes and crystallite. Will start daily vitamins. Maintaining a high protein, low carb diet. Denies nausea/vomiting. Does not have acid reflux. Denies fevers/chills. BMs are normal. Did have one episode of dumping after eating Greek yogurt. Almost resolved pruritic rash around port sites s/p topical steroids.  [GERD] : no GERD

## 2025-07-24 NOTE — PHYSICAL EXAM
[de-identified] : non-icteric [de-identified] : WNL [de-identified] : WNL [de-identified] : Healing incisions

## 2025-07-24 NOTE — ASSESSMENT
[de-identified] : Plan: Continue high-protein low-carb diet Dietary counseling provided Increase activities as tolerated Check blood work  Continue vitamins daily Will see Derm Follow-up in 6 weeks

## 2025-07-24 NOTE — ASSESSMENT
[de-identified] : Plan: Continue high-protein low-carb diet Dietary counseling provided Increase activities as tolerated Check blood work  Continue vitamins daily Will see Derm Follow-up in 6 weeks

## 2025-07-24 NOTE — PHYSICAL EXAM
[de-identified] : non-icteric [de-identified] : WNL [de-identified] : WNL [de-identified] : Healing incisions